# Patient Record
Sex: MALE | URBAN - METROPOLITAN AREA
[De-identification: names, ages, dates, MRNs, and addresses within clinical notes are randomized per-mention and may not be internally consistent; named-entity substitution may affect disease eponyms.]

---

## 2019-02-06 ENCOUNTER — OFFICE VISIT (OUTPATIENT)
Dept: URBAN - METROPOLITAN AREA CLINIC 88 | Facility: CLINIC | Age: 64
End: 2019-02-06
Payer: MEDICARE

## 2019-02-06 DIAGNOSIS — E11.9 TYPE 2 DIABETES MELLITUS WITHOUT COMPLICATIONS: Primary | ICD-10-CM

## 2019-02-06 DIAGNOSIS — H11.153 PINGUECULA, BILATERAL: ICD-10-CM

## 2019-02-06 DIAGNOSIS — H17.9 CORNEAL OPACITY: ICD-10-CM

## 2019-02-06 DIAGNOSIS — H25.13 AGE-RELATED NUCLEAR CATARACT, BILATERAL: ICD-10-CM

## 2019-02-06 PROCEDURE — 92004 COMPRE OPH EXAM NEW PT 1/>: CPT | Performed by: OPHTHALMOLOGY

## 2019-02-06 ASSESSMENT — INTRAOCULAR PRESSURE
OS: 15
OD: 15

## 2019-02-06 ASSESSMENT — KERATOMETRY
OD: 41.13
OS: 40.75

## 2019-02-06 ASSESSMENT — VISUAL ACUITY
OD: 20/25
OS: 20/25

## 2019-02-06 NOTE — IMPRESSION/PLAN
Impression: Corneal opacity: H17.9. OD. Condition: stable. Condition: stable. Plan: Old injury.  Continue to observe

## 2019-02-06 NOTE — IMPRESSION/PLAN
Impression: Pinguecula, bilateral: H11.153. OU. Condition: stable. Plan: Discussed diagnosis in detail with patient. No treatment is required at this time. UV protection recommended.

## 2019-02-06 NOTE — IMPRESSION/PLAN
Impression: Type 2 diabetes mellitus without complications: C60.4. Condition: stable. Symptoms: will continue to monitor. Plan: Diabetes type II: no background retinopathy, no signs of neovascularization noted. Discussed ocular and systemic benefits of blood sugar control.